# Patient Record
Sex: FEMALE | Race: WHITE | NOT HISPANIC OR LATINO | Employment: UNEMPLOYED | URBAN - METROPOLITAN AREA
[De-identification: names, ages, dates, MRNs, and addresses within clinical notes are randomized per-mention and may not be internally consistent; named-entity substitution may affect disease eponyms.]

---

## 2017-04-04 ENCOUNTER — GENERIC CONVERSION - ENCOUNTER (OUTPATIENT)
Dept: OTHER | Facility: OTHER | Age: 57
End: 2017-04-04

## 2017-05-22 ENCOUNTER — GENERIC CONVERSION - ENCOUNTER (OUTPATIENT)
Dept: OTHER | Facility: OTHER | Age: 57
End: 2017-05-22

## 2017-05-22 ENCOUNTER — ALLSCRIPTS OFFICE VISIT (OUTPATIENT)
Dept: OTHER | Facility: OTHER | Age: 57
End: 2017-05-22

## 2017-05-23 ENCOUNTER — GENERIC CONVERSION - ENCOUNTER (OUTPATIENT)
Dept: OTHER | Facility: OTHER | Age: 57
End: 2017-05-23

## 2017-06-13 RX ORDER — OXYCODONE HCL 10 MG/1
10 TABLET, FILM COATED, EXTENDED RELEASE ORAL EVERY 12 HOURS
Status: ON HOLD | COMMUNITY
End: 2017-06-28

## 2017-06-13 RX ORDER — MULTIVIT-MIN/IRON/FOLIC ACID/K 18-600-40
2000 CAPSULE ORAL DAILY
COMMUNITY

## 2017-06-13 RX ORDER — FLUTICASONE PROPIONATE 50 MCG
1 SPRAY, SUSPENSION (ML) NASAL DAILY
COMMUNITY

## 2017-06-13 RX ORDER — PRASTERONE (DHEA) 50 MG
50 CAPSULE ORAL DAILY
COMMUNITY

## 2017-06-13 RX ORDER — METHOCARBAMOL 750 MG/1
750 TABLET, FILM COATED ORAL AS NEEDED
COMMUNITY

## 2017-06-13 RX ORDER — PREGABALIN 200 MG/1
200 CAPSULE ORAL 2 TIMES DAILY
COMMUNITY

## 2017-06-13 RX ORDER — NORETHINDRONE ACETATE AND ETHINYL ESTRADIOL .5; 2.5 MG/1; UG/1
1 TABLET ORAL DAILY
COMMUNITY

## 2017-06-14 ENCOUNTER — ANESTHESIA EVENT (OUTPATIENT)
Dept: PERIOP | Facility: HOSPITAL | Age: 57
End: 2017-06-14
Payer: COMMERCIAL

## 2017-06-15 RX ORDER — OXYCODONE HYDROCHLORIDE AND ACETAMINOPHEN 5; 325 MG/1; MG/1
1 TABLET ORAL EVERY 4 HOURS PRN
COMMUNITY

## 2017-06-16 RX ORDER — BIOTIN 10000 MCG
1 CAPSULE ORAL DAILY
COMMUNITY

## 2017-06-19 RX ORDER — SODIUM CHLORIDE, SODIUM LACTATE, POTASSIUM CHLORIDE, CALCIUM CHLORIDE 600; 310; 30; 20 MG/100ML; MG/100ML; MG/100ML; MG/100ML
20 INJECTION, SOLUTION INTRAVENOUS CONTINUOUS
Status: DISCONTINUED | OUTPATIENT
Start: 2017-06-20 | End: 2017-06-20 | Stop reason: HOSPADM

## 2017-06-20 ENCOUNTER — ANESTHESIA (OUTPATIENT)
Dept: PERIOP | Facility: HOSPITAL | Age: 57
End: 2017-06-20
Payer: COMMERCIAL

## 2017-06-20 ENCOUNTER — APPOINTMENT (OUTPATIENT)
Dept: RADIOLOGY | Facility: HOSPITAL | Age: 57
End: 2017-06-20
Payer: COMMERCIAL

## 2017-06-20 ENCOUNTER — HOSPITAL ENCOUNTER (OUTPATIENT)
Facility: HOSPITAL | Age: 57
Setting detail: OUTPATIENT SURGERY
Discharge: HOME | End: 2017-06-20
Attending: NEUROLOGICAL SURGERY | Admitting: NEUROLOGICAL SURGERY
Payer: COMMERCIAL

## 2017-06-20 VITALS
OXYGEN SATURATION: 99 % | RESPIRATION RATE: 22 BRPM | HEART RATE: 58 BPM | WEIGHT: 130 LBS | DIASTOLIC BLOOD PRESSURE: 58 MMHG | SYSTOLIC BLOOD PRESSURE: 131 MMHG | BODY MASS INDEX: 23.92 KG/M2 | HEIGHT: 62 IN | TEMPERATURE: 97.8 F

## 2017-06-20 LAB
ABO GROUP BLD: NORMAL
BLD GP AB SCN SERPL QL: NEGATIVE
EXT PREGNANCY TEST URINE: NEGATIVE
RH BLD: POSITIVE
SPECIMEN EXPIRATION DATE: NORMAL

## 2017-06-20 PROCEDURE — 86901 BLOOD TYPING SEROLOGIC RH(D): CPT | Performed by: NEUROLOGICAL SURGERY

## 2017-06-20 PROCEDURE — 81025 URINE PREGNANCY TEST: CPT | Performed by: NEUROLOGICAL SURGERY

## 2017-06-20 PROCEDURE — 86900 BLOOD TYPING SEROLOGIC ABO: CPT | Performed by: NEUROLOGICAL SURGERY

## 2017-06-20 PROCEDURE — 72070 X-RAY EXAM THORAC SPINE 2VWS: CPT

## 2017-06-20 PROCEDURE — 86850 RBC ANTIBODY SCREEN: CPT | Performed by: NEUROLOGICAL SURGERY

## 2017-06-20 PROCEDURE — C1883 ADAPT/EXT, PACING/NEURO LEAD: HCPCS | Performed by: NEUROLOGICAL SURGERY

## 2017-06-20 PROCEDURE — C1767 GENERATOR, NEURO NON-RECHARG: HCPCS | Performed by: NEUROLOGICAL SURGERY

## 2017-06-20 DEVICE — IMPLANTABLE DEVICE
Type: IMPLANTABLE DEVICE | Site: BACK | Status: NON-FUNCTIONAL
Removed: 2017-06-28

## 2017-06-20 RX ORDER — FENTANYL CITRATE 50 UG/ML
INJECTION, SOLUTION INTRAMUSCULAR; INTRAVENOUS AS NEEDED
Status: DISCONTINUED | OUTPATIENT
Start: 2017-06-20 | End: 2017-06-20 | Stop reason: SURG

## 2017-06-20 RX ORDER — OXYCODONE HYDROCHLORIDE AND ACETAMINOPHEN 5; 325 MG/1; MG/1
2 TABLET ORAL EVERY 4 HOURS PRN
Status: DISCONTINUED | OUTPATIENT
Start: 2017-06-20 | End: 2017-06-20 | Stop reason: HOSPADM

## 2017-06-20 RX ORDER — ONDANSETRON 2 MG/ML
INJECTION INTRAMUSCULAR; INTRAVENOUS AS NEEDED
Status: DISCONTINUED | OUTPATIENT
Start: 2017-06-20 | End: 2017-06-20 | Stop reason: SURG

## 2017-06-20 RX ORDER — ONDANSETRON 2 MG/ML
4 INJECTION INTRAMUSCULAR; INTRAVENOUS EVERY 6 HOURS PRN
Status: DISCONTINUED | OUTPATIENT
Start: 2017-06-20 | End: 2017-06-20 | Stop reason: HOSPADM

## 2017-06-20 RX ORDER — SUCCINYLCHOLINE CHLORIDE 20 MG/ML
INJECTION INTRAMUSCULAR; INTRAVENOUS AS NEEDED
Status: DISCONTINUED | OUTPATIENT
Start: 2017-06-20 | End: 2017-06-20 | Stop reason: SURG

## 2017-06-20 RX ORDER — ROCURONIUM BROMIDE 10 MG/ML
INJECTION, SOLUTION INTRAVENOUS AS NEEDED
Status: DISCONTINUED | OUTPATIENT
Start: 2017-06-20 | End: 2017-06-20 | Stop reason: SURG

## 2017-06-20 RX ORDER — FENTANYL CITRATE/PF 50 MCG/ML
25 SYRINGE (ML) INJECTION
Status: DISCONTINUED | OUTPATIENT
Start: 2017-06-20 | End: 2017-06-20 | Stop reason: HOSPADM

## 2017-06-20 RX ORDER — PROPOFOL 10 MG/ML
INJECTION, EMULSION INTRAVENOUS AS NEEDED
Status: DISCONTINUED | OUTPATIENT
Start: 2017-06-20 | End: 2017-06-20 | Stop reason: SURG

## 2017-06-20 RX ORDER — METOCLOPRAMIDE HYDROCHLORIDE 5 MG/ML
10 INJECTION INTRAMUSCULAR; INTRAVENOUS ONCE AS NEEDED
Status: DISCONTINUED | OUTPATIENT
Start: 2017-06-20 | End: 2017-06-20 | Stop reason: HOSPADM

## 2017-06-20 RX ORDER — ONDANSETRON 2 MG/ML
4 INJECTION INTRAMUSCULAR; INTRAVENOUS ONCE AS NEEDED
Status: DISCONTINUED | OUTPATIENT
Start: 2017-06-20 | End: 2017-06-20 | Stop reason: HOSPADM

## 2017-06-20 RX ORDER — PROPOFOL 10 MG/ML
INJECTION, EMULSION INTRAVENOUS CONTINUOUS PRN
Status: DISCONTINUED | OUTPATIENT
Start: 2017-06-20 | End: 2017-06-20 | Stop reason: SURG

## 2017-06-20 RX ORDER — MIDAZOLAM HYDROCHLORIDE 1 MG/ML
INJECTION INTRAMUSCULAR; INTRAVENOUS AS NEEDED
Status: DISCONTINUED | OUTPATIENT
Start: 2017-06-20 | End: 2017-06-20 | Stop reason: SURG

## 2017-06-20 RX ADMIN — PROPOFOL 100 MCG/KG/MIN: 10 INJECTION, EMULSION INTRAVENOUS at 12:25

## 2017-06-20 RX ADMIN — CEFAZOLIN SODIUM 2000 MG: 2 SOLUTION INTRAVENOUS at 12:33

## 2017-06-20 RX ADMIN — SODIUM CHLORIDE, SODIUM LACTATE, POTASSIUM CHLORIDE, AND CALCIUM CHLORIDE 20 ML/HR: .6; .31; .03; .02 INJECTION, SOLUTION INTRAVENOUS at 11:40

## 2017-06-20 RX ADMIN — OXYCODONE HYDROCHLORIDE AND ACETAMINOPHEN 2 TABLET: 5; 325 TABLET ORAL at 16:42

## 2017-06-20 RX ADMIN — ROCURONIUM BROMIDE 5 MG: 10 INJECTION, SOLUTION INTRAVENOUS at 12:25

## 2017-06-20 RX ADMIN — ONDANSETRON 4 MG: 2 INJECTION INTRAMUSCULAR; INTRAVENOUS at 14:45

## 2017-06-20 RX ADMIN — FENTANYL CITRATE 25 MCG: 50 INJECTION, SOLUTION INTRAMUSCULAR; INTRAVENOUS at 15:33

## 2017-06-20 RX ADMIN — FENTANYL CITRATE 25 MCG: 50 INJECTION, SOLUTION INTRAMUSCULAR; INTRAVENOUS at 15:43

## 2017-06-20 RX ADMIN — FENTANYL CITRATE 100 MCG: 50 INJECTION, SOLUTION INTRAMUSCULAR; INTRAVENOUS at 12:25

## 2017-06-20 RX ADMIN — MIDAZOLAM HYDROCHLORIDE 2 MG: 1 INJECTION, SOLUTION INTRAMUSCULAR; INTRAVENOUS at 12:25

## 2017-06-20 RX ADMIN — SUCCINYLCHOLINE CHLORIDE 100 MG: 20 INJECTION, SOLUTION INTRAMUSCULAR; INTRAVENOUS at 12:25

## 2017-06-20 RX ADMIN — PROPOFOL 150 MG: 10 INJECTION, EMULSION INTRAVENOUS at 12:25

## 2017-06-27 ENCOUNTER — GENERIC CONVERSION - ENCOUNTER (OUTPATIENT)
Dept: OTHER | Facility: OTHER | Age: 57
End: 2017-06-27

## 2017-06-28 ENCOUNTER — ANESTHESIA (OUTPATIENT)
Dept: PERIOP | Facility: HOSPITAL | Age: 57
End: 2017-06-28
Payer: COMMERCIAL

## 2017-06-28 ENCOUNTER — ANESTHESIA EVENT (OUTPATIENT)
Dept: PERIOP | Facility: HOSPITAL | Age: 57
End: 2017-06-28
Payer: COMMERCIAL

## 2017-06-28 ENCOUNTER — HOSPITAL ENCOUNTER (OUTPATIENT)
Facility: HOSPITAL | Age: 57
Setting detail: OUTPATIENT SURGERY
Discharge: HOME | End: 2017-06-28
Attending: NEUROLOGICAL SURGERY | Admitting: NEUROLOGICAL SURGERY
Payer: COMMERCIAL

## 2017-06-28 ENCOUNTER — GENERIC CONVERSION - ENCOUNTER (OUTPATIENT)
Dept: PERIOP | Facility: HOSPITAL | Age: 57
End: 2017-06-28

## 2017-06-28 ENCOUNTER — APPOINTMENT (OUTPATIENT)
Dept: RADIOLOGY | Facility: HOSPITAL | Age: 57
End: 2017-06-28
Payer: COMMERCIAL

## 2017-06-28 VITALS
HEART RATE: 64 BPM | SYSTOLIC BLOOD PRESSURE: 114 MMHG | TEMPERATURE: 99 F | WEIGHT: 130 LBS | HEIGHT: 62 IN | OXYGEN SATURATION: 99 % | DIASTOLIC BLOOD PRESSURE: 71 MMHG | BODY MASS INDEX: 23.92 KG/M2 | RESPIRATION RATE: 16 BRPM

## 2017-06-28 LAB
ABO GROUP BLD: NORMAL
BLD GP AB SCN SERPL QL: NEGATIVE
GLUCOSE SERPL-MCNC: 96 MG/DL (ref 65–140)
RH BLD: POSITIVE
SPECIMEN EXPIRATION DATE: NORMAL

## 2017-06-28 PROCEDURE — 82948 REAGENT STRIP/BLOOD GLUCOSE: CPT

## 2017-06-28 PROCEDURE — 72100 X-RAY EXAM L-S SPINE 2/3 VWS: CPT

## 2017-06-28 PROCEDURE — 86901 BLOOD TYPING SEROLOGIC RH(D): CPT | Performed by: NEUROLOGICAL SURGERY

## 2017-06-28 PROCEDURE — 86900 BLOOD TYPING SEROLOGIC ABO: CPT | Performed by: NEUROLOGICAL SURGERY

## 2017-06-28 PROCEDURE — 86850 RBC ANTIBODY SCREEN: CPT | Performed by: NEUROLOGICAL SURGERY

## 2017-06-28 RX ORDER — SUCCINYLCHOLINE CHLORIDE 20 MG/ML
INJECTION INTRAMUSCULAR; INTRAVENOUS AS NEEDED
Status: DISCONTINUED | OUTPATIENT
Start: 2017-06-28 | End: 2017-06-28 | Stop reason: SURG

## 2017-06-28 RX ORDER — KETAMINE HYDROCHLORIDE 50 MG/ML
INJECTION, SOLUTION, CONCENTRATE INTRAMUSCULAR; INTRAVENOUS AS NEEDED
Status: DISCONTINUED | OUTPATIENT
Start: 2017-06-28 | End: 2017-06-28 | Stop reason: SURG

## 2017-06-28 RX ORDER — SODIUM CHLORIDE, SODIUM LACTATE, POTASSIUM CHLORIDE, CALCIUM CHLORIDE 600; 310; 30; 20 MG/100ML; MG/100ML; MG/100ML; MG/100ML
75 INJECTION, SOLUTION INTRAVENOUS CONTINUOUS
Status: DISCONTINUED | OUTPATIENT
Start: 2017-06-28 | End: 2017-06-28 | Stop reason: HOSPADM

## 2017-06-28 RX ORDER — PROPOFOL 10 MG/ML
INJECTION, EMULSION INTRAVENOUS AS NEEDED
Status: DISCONTINUED | OUTPATIENT
Start: 2017-06-28 | End: 2017-06-28 | Stop reason: SURG

## 2017-06-28 RX ORDER — PROPOFOL 10 MG/ML
INJECTION, EMULSION INTRAVENOUS CONTINUOUS PRN
Status: DISCONTINUED | OUTPATIENT
Start: 2017-06-28 | End: 2017-06-28 | Stop reason: SURG

## 2017-06-28 RX ORDER — LIDOCAINE HYDROCHLORIDE 10 MG/ML
INJECTION, SOLUTION INFILTRATION; PERINEURAL AS NEEDED
Status: DISCONTINUED | OUTPATIENT
Start: 2017-06-28 | End: 2017-06-28 | Stop reason: SURG

## 2017-06-28 RX ORDER — FENTANYL CITRATE/PF 50 MCG/ML
50 SYRINGE (ML) INJECTION
Status: DISCONTINUED | OUTPATIENT
Start: 2017-06-28 | End: 2017-06-28 | Stop reason: HOSPADM

## 2017-06-28 RX ORDER — TRAMADOL HYDROCHLORIDE 50 MG/1
50 TABLET ORAL EVERY 4 HOURS PRN
Status: DISCONTINUED | OUTPATIENT
Start: 2017-06-28 | End: 2017-06-28 | Stop reason: HOSPADM

## 2017-06-28 RX ORDER — ONDANSETRON 2 MG/ML
INJECTION INTRAMUSCULAR; INTRAVENOUS AS NEEDED
Status: DISCONTINUED | OUTPATIENT
Start: 2017-06-28 | End: 2017-06-28 | Stop reason: SURG

## 2017-06-28 RX ORDER — SODIUM CHLORIDE, SODIUM LACTATE, POTASSIUM CHLORIDE, CALCIUM CHLORIDE 600; 310; 30; 20 MG/100ML; MG/100ML; MG/100ML; MG/100ML
INJECTION, SOLUTION INTRAVENOUS CONTINUOUS PRN
Status: DISCONTINUED | OUTPATIENT
Start: 2017-06-28 | End: 2017-06-28 | Stop reason: SURG

## 2017-06-28 RX ORDER — ONDANSETRON 2 MG/ML
4 INJECTION INTRAMUSCULAR; INTRAVENOUS EVERY 4 HOURS PRN
Status: DISCONTINUED | OUTPATIENT
Start: 2017-06-28 | End: 2017-06-28 | Stop reason: HOSPADM

## 2017-06-28 RX ORDER — CHLORHEXIDINE GLUCONATE 0.12 MG/ML
15 RINSE ORAL ONCE
Status: COMPLETED | OUTPATIENT
Start: 2017-06-28 | End: 2017-06-28

## 2017-06-28 RX ORDER — CHLORHEXIDINE GLUCONATE 0.12 MG/ML
15 RINSE ORAL ONCE
Status: DISCONTINUED | OUTPATIENT
Start: 2017-06-28 | End: 2017-06-28

## 2017-06-28 RX ORDER — MIDAZOLAM HYDROCHLORIDE 1 MG/ML
INJECTION INTRAMUSCULAR; INTRAVENOUS AS NEEDED
Status: DISCONTINUED | OUTPATIENT
Start: 2017-06-28 | End: 2017-06-28 | Stop reason: SURG

## 2017-06-28 RX ORDER — ACETAMINOPHEN 325 MG/1
650 TABLET ORAL EVERY 6 HOURS PRN
Status: DISCONTINUED | OUTPATIENT
Start: 2017-06-28 | End: 2017-06-28 | Stop reason: HOSPADM

## 2017-06-28 RX ORDER — OXYCODONE HYDROCHLORIDE AND ACETAMINOPHEN 5; 325 MG/1; MG/1
2 TABLET ORAL ONCE
Status: COMPLETED | OUTPATIENT
Start: 2017-06-28 | End: 2017-06-28

## 2017-06-28 RX ORDER — FENTANYL CITRATE 50 UG/ML
INJECTION, SOLUTION INTRAMUSCULAR; INTRAVENOUS AS NEEDED
Status: DISCONTINUED | OUTPATIENT
Start: 2017-06-28 | End: 2017-06-28 | Stop reason: SURG

## 2017-06-28 RX ORDER — MULTIVIT-MIN/IRON/FOLIC ACID/K 18-600-40
1 CAPSULE ORAL DAILY
COMMUNITY

## 2017-06-28 RX ORDER — LIDOCAINE HYDROCHLORIDE AND EPINEPHRINE 10; 10 MG/ML; UG/ML
INJECTION, SOLUTION INFILTRATION; PERINEURAL AS NEEDED
Status: DISCONTINUED | OUTPATIENT
Start: 2017-06-28 | End: 2017-06-28 | Stop reason: HOSPADM

## 2017-06-28 RX ADMIN — CEFAZOLIN SODIUM 2000 MG: 2 SOLUTION INTRAVENOUS at 08:31

## 2017-06-28 RX ADMIN — KETAMINE HYDROCHLORIDE 20 MG: 50 INJECTION, SOLUTION INTRAMUSCULAR; INTRAVENOUS at 08:53

## 2017-06-28 RX ADMIN — FENTANYL CITRATE 50 MCG: 50 INJECTION INTRAMUSCULAR; INTRAVENOUS at 10:06

## 2017-06-28 RX ADMIN — HYDROMORPHONE HYDROCHLORIDE 1 MG: 1 INJECTION, SOLUTION INTRAMUSCULAR; INTRAVENOUS; SUBCUTANEOUS at 11:03

## 2017-06-28 RX ADMIN — FENTANYL CITRATE 50 MCG: 50 INJECTION, SOLUTION INTRAMUSCULAR; INTRAVENOUS at 08:55

## 2017-06-28 RX ADMIN — FENTANYL CITRATE 50 MCG: 50 INJECTION, SOLUTION INTRAMUSCULAR; INTRAVENOUS at 09:06

## 2017-06-28 RX ADMIN — ONDANSETRON 4 MG: 2 INJECTION INTRAMUSCULAR; INTRAVENOUS at 09:13

## 2017-06-28 RX ADMIN — OXYCODONE HYDROCHLORIDE AND ACETAMINOPHEN 2 TABLET: 5; 325 TABLET ORAL at 10:39

## 2017-06-28 RX ADMIN — HYDROMORPHONE HYDROCHLORIDE 1 MG: 1 INJECTION, SOLUTION INTRAMUSCULAR; INTRAVENOUS; SUBCUTANEOUS at 10:42

## 2017-06-28 RX ADMIN — PROPOFOL 180 MG: 10 INJECTION, EMULSION INTRAVENOUS at 08:29

## 2017-06-28 RX ADMIN — HYDROMORPHONE HYDROCHLORIDE 0.2 MG: 1 INJECTION, SOLUTION INTRAMUSCULAR; INTRAVENOUS; SUBCUTANEOUS at 10:14

## 2017-06-28 RX ADMIN — SUCCINYLCHOLINE CHLORIDE 100 MG: 20 INJECTION, SOLUTION INTRAMUSCULAR; INTRAVENOUS at 08:29

## 2017-06-28 RX ADMIN — CHLORHEXIDINE GLUCONATE 15 ML: 1.2 RINSE ORAL at 07:01

## 2017-06-28 RX ADMIN — HYDROMORPHONE HYDROCHLORIDE 0.2 MG: 1 INJECTION, SOLUTION INTRAMUSCULAR; INTRAVENOUS; SUBCUTANEOUS at 10:24

## 2017-06-28 RX ADMIN — PROPOFOL 100 MCG/KG/MIN: 10 INJECTION, EMULSION INTRAVENOUS at 08:30

## 2017-06-28 RX ADMIN — SODIUM CHLORIDE, SODIUM LACTATE, POTASSIUM CHLORIDE, AND CALCIUM CHLORIDE: .6; .31; .03; .02 INJECTION, SOLUTION INTRAVENOUS at 08:10

## 2017-06-28 RX ADMIN — KETAMINE HYDROCHLORIDE 30 MG: 50 INJECTION, SOLUTION INTRAMUSCULAR; INTRAVENOUS at 08:29

## 2017-06-28 RX ADMIN — LIDOCAINE HYDROCHLORIDE 50 MG: 10 INJECTION, SOLUTION INFILTRATION; PERINEURAL at 08:29

## 2017-06-28 RX ADMIN — HYDROMORPHONE HYDROCHLORIDE 0.2 MG: 1 INJECTION, SOLUTION INTRAMUSCULAR; INTRAVENOUS; SUBCUTANEOUS at 10:34

## 2017-06-28 RX ADMIN — FENTANYL CITRATE 100 MCG: 50 INJECTION, SOLUTION INTRAMUSCULAR; INTRAVENOUS at 08:29

## 2017-06-28 RX ADMIN — DEXAMETHASONE SODIUM PHOSPHATE 10 MG: 10 INJECTION INTRAMUSCULAR; INTRAVENOUS at 08:35

## 2017-06-28 RX ADMIN — HYDROMORPHONE HYDROCHLORIDE 0.2 MG: 1 INJECTION, SOLUTION INTRAMUSCULAR; INTRAVENOUS; SUBCUTANEOUS at 10:19

## 2017-06-28 RX ADMIN — SODIUM CHLORIDE, SODIUM LACTATE, POTASSIUM CHLORIDE, AND CALCIUM CHLORIDE 75 ML/HR: .6; .31; .03; .02 INJECTION, SOLUTION INTRAVENOUS at 10:45

## 2017-06-28 RX ADMIN — MIDAZOLAM HYDROCHLORIDE 2 MG: 1 INJECTION, SOLUTION INTRAMUSCULAR; INTRAVENOUS at 08:21

## 2017-06-28 RX ADMIN — HYDROMORPHONE HYDROCHLORIDE 0.2 MG: 1 INJECTION, SOLUTION INTRAMUSCULAR; INTRAVENOUS; SUBCUTANEOUS at 10:29

## 2017-06-28 RX ADMIN — FENTANYL CITRATE 50 MCG: 50 INJECTION INTRAMUSCULAR; INTRAVENOUS at 10:00

## 2017-06-30 ENCOUNTER — GENERIC CONVERSION - ENCOUNTER (OUTPATIENT)
Dept: OTHER | Facility: OTHER | Age: 57
End: 2017-06-30

## 2017-07-12 ENCOUNTER — ALLSCRIPTS OFFICE VISIT (OUTPATIENT)
Dept: OTHER | Facility: OTHER | Age: 57
End: 2017-07-12

## 2017-11-09 ENCOUNTER — GENERIC CONVERSION - ENCOUNTER (OUTPATIENT)
Dept: OTHER | Facility: OTHER | Age: 57
End: 2017-11-09

## 2018-01-09 NOTE — PROGRESS NOTES
Assessment    1  Encounter for postoperative care (V59 22) (Z73 03)    Discussion/Summary    Very pleasant 72-year-old female returns for two-week postoperative follow-up, status post "Removal of Two Spinal Cord Stimulators along with Dorsal Root Ganglion Electrodes- L5 and S1," she reports overall she is pleased with surgical outcome, although she is disappointed that the DRG was ineffective in improving/controlling her chronic foot pain  Wound care was reviewed with the patient, specifically she is to observe for redness, swelling, increased pain, drainage or fever, should these be observed he understands she is too call and/or return immediately for reassessment  Additionally the patient understands he may shower, wash with soap and water, pat wound dry, she also understands he is to avoid immersion in water such as swimming, hot tub use or tub bath, and may resume immersion in water in approximately 2 weeks  Activity levels were also reviewed with the patient in detail, she is to lift no greater than 10 pounds, she is advised she may occasionally bend, ambulation is encouraged as tolerated  Further follow with neurosurgery will be on an as needed basis  These findings, impressions and recommendations are reviewed in great detail with the patient, she expressed understanding and agreement, her questions were answered completely and to her satisfaction  Follow up has been scheduled  The patient was counseled regarding instructions for management, patient and family education, importance of compliance with treatment  The patient has the current Goals: Return to all usual activities  Patient is able to Self-Care  Chief Complaint  Postoperative follow-up, 2 weeks, status post removal DRG and generator  Post-Op  Post-Op Lumbar/Sacral Spine:   Thuy Saldivar is status post on 06/28/2017   1   Removal of Two Spinal Cord Stimulators along Dorsal Root Ganglion Electrodes- L5 and S1 2 Removal of extensions      History of Present Illness: The patient reports lower extremity numbness, lower extremity pain and walking/standing/sitting intolerance, but no nausea, no fever, no back pain, no lower extremity weakness and normal bowel and bladder function  Physical Examination:   Surgical incision site is clean, dry and intact (the incision site had intact staples and had no drainage )  The surrounding skin findings included normal appearance  Evaluation of the back demonstrates no warmth, no erythema, no swelling, no induration, no ecchymosis and no tenderness  The gait was normal and the station was normal    Lower Extremity DVT Assessment: no signs or symptoms suggestive of deep vein thrombosis, no calf swelling and no palpable cord in calf  Motor Exam: motor groups within normal limits of strength & tone bilaterally  Sensory Exam: sensory functions within normal limits bilaterally  Assessment:   Post-op, the patient is doing well, with good pain control and without signs of an infection  Plan: To Do For Next Visit:  Further follow-up as needed  Review of Systems    Constitutional: No fever, no chills, feels well, no tiredness, no recent weight gain or weight loss  Eyes: No complaints of eye pain, no red eyes, no eyesight problems, no discharge, no dry eyes, no itching of eyes  ENT: no complaints of earache, no loss of hearing, no nose bleeds, no nasal discharge, no sore throat, no hoarseness  Cardiovascular: No complaints of slow heart rate, no fast heart rate, no chest pain, no palpitations, no leg claudication, no lower extremity edema  Respiratory: No complaints of shortness of breath, no wheezing, no cough, no SOB on exertion, no orthopnea, no PND  Gastrointestinal: No complaints of abdominal pain, no constipation, no nausea or vomiting, no diarrhea, no bloody stools     Genitourinary: No complaints of dysuria, no incontinence, no pelvic pain, no dysmenorrhea, no vaginal discharge or bleeding  Musculoskeletal: No complaints of arthralgias, no myalgias, no joint swelling or stiffness, no limb pain or swelling  Integumentary: skin wound and incisions  Neurological: numbness (left thigh) and difficulty walking  Psychiatric: Not suicidal, no sleep disturbance, no anxiety or depression, no change in personality, no emotional problems  Endocrine: No complaints of proptosis, no hot flashes, no muscle weakness, no deepening of the voice, no feelings of weakness  Hematologic/Lymphatic: No complaints of swollen glands, no swollen glands in the neck, does not bleed easily, does not bruise easily  ROS reviewed  Active Problems    1  Allergic rhinitis (477 9) (J30 9)   2  Arthritis of right shoulder region (716 91) (M19 011)   3  Chronic diarrhea (787 91) (K52 9)   4  Chronic pain in right foot (729 5,338 29) (M79 671,G89 29)   5  Chronic pain syndrome (338 4) (G89 4)   6  Complex regional pain syndrome type 1 of right lower extremity (337 22) (G90 521)   7  Osteopenia (733 90) (M85 80)   8  Post-operative state (V45 89) (Z98 890)   9  Pre-procedural laboratory examinations (V72 63) (Z01 812)   10  Raynaud disease (443 0) (I73 00)   11  Sensory peripheral neuropathy (356 9) (G62 9)    Social History    · Alcohol use (V49 89) (Z78 9)   · Exercise frequency (times/week)   ·    · Never a smoker   · Sedentary lifestyle (V15 89) (Z91 89)    Current Meds   1  Biotin 1000 MCG Oral Tablet; Therapy: (Recorded:34Hcm1495) to Recorded   2  BL Vitamin C 500 MG TABS; Therapy: (Recorded:89Aqh2937) to Recorded   3  Caltrate 600+D 600-400 MG-UNIT TABS; Therapy: (Recorded:17Tgr9207) to Recorded   4  Centrum Silver TABS; Therapy: (Recorded:66Vou4854) to Recorded   5  DHEA 50 MG Oral Capsule; TAKE AS DIRECTED; Therapy: 54PPO2791 to (Last Rx:22Jun2016) Ordered   6   Fluticasone Propionate 50 MCG/ACT Nasal Suspension; USE 2 SPRAYS IN EACH   NOSTRIL ONCE DAILY; Therapy: 47WCF3413 to (Last Rx:22Jun2016) Ordered   7  [facility]; Therapy: (Recorded:15Nlk4490) to Recorded   8  Lyrica 200 MG Oral Capsule; Take 1 capsule twice daily; Therapy: (Laxmi Cruz) to Recorded   9  Methocarbamol 750 MG Oral Tablet; TAKE 1 TABLET 3 times daily PRN; Therapy: 85HDM4478 to (Last Rx:22Jun2016) Ordered   10  Norethindrone-Eth Estradiol 0 5-2 5 MG-MCG Oral Tablet; TAKE 1 TABLET DAILY AS    DIRECTED; Therapy: 78TEH1652 to (Evaluate:17Jun2017); Last Rx:22Jun2016 Ordered   11  Oxycodone-Acetaminophen 5-325 MG Oral Tablet; take 1 to 2 tablets every 4 hours as    needed for pain; Therapy: 44LBJ6472 to (Last Rx:29Jun2017) Ordered   12  Vitamin D3 2000 UNIT Oral Capsule; TAKE 2 CAPSULE Daily; Therapy: 20LSZ5127 to (Last Rx:22Jun2016) Ordered    Allergies    1  No Known Drug Allergies    Vitals   Recorded: 93Vge9551 10:42AM   Temperature 98 2 F   Heart Rate 73   Respiration 14   Systolic 612   Diastolic 75   Height 5 ft 2 in   Weight 130 lb    BMI Calculated 23 78   BSA Calculated 1 59     Physical Exam     Respiratory Respiratory effort: Normal   Auscultation of lungs: Clear to auscultation bilaterally  Cardiovascular Auscultation of heart: Rate is regular  Rhythm is regular  No heart murmur appreciated  Procedure  Procedure: suture removal  The wound was located on the right buttock and left buttock  The wound was Combined approximately 12 cm in length  Wound Exam: well healed with no sign of infection  Procedure Note: Approximately 20 staples were removed  Patient Status:  the patient tolerated the procedure well  Complications:  there were no complications  Future Appointments    Date/Time Provider Specialty Site   08/10/2017 11:00 AM Reed Primrose, M D   Neurosurgery [facility]     Signatures   Electronically signed by : Vinod Goss, [facility]; Jul 12 2017 11:15AM EST                       (Author)    Electronically signed by : Jolie Gottlieb, [facility]; Jul 12 2017 11:16AM EST                       (Author)    Electronically signed by : DOTTY Justin ; Jul 17 2017  9:21AM EST                       (Author)

## 2018-01-10 NOTE — RESULT NOTES
Message  The patient required returning to the operating room after the week-long trial for removal of her trial electrodes secondary to the use of extensions for a tunneled trial  These could only be removed via an open approach        Signatures   Electronically signed by : DOTTY Wilson ; Nov 9 2017  4:54PM EST                       (Author)

## 2018-01-13 NOTE — MISCELLANEOUS
Message  S/w pt on telephone that is scheduled for surgery tomorrow, 6/28/17  She is having removal of trial electrode  Verified allergies and went over pre-op instructions, including bathing and NPO status  Patient currently denies taking any blood thinning medications  Answered any questions she may have had at this time  Confirmed pharmacy on file and sent over post-op antibiotic and explained to patient when to start taking it  Active Problems    1  Allergic rhinitis (477 9) (J30 9)   2  Arthritis of right shoulder region (716 91) (M19 011)   3  Chronic diarrhea (787 91) (K52 9)   4  Chronic pain in right foot (729 5,338 29) (M79 671,G89 29)   5  Chronic pain syndrome (338 4) (G89 4)   6  Complex regional pain syndrome type 1 of right lower extremity (337 22) (G90 521)   7  Osteopenia (733 90) (M85 80)   8  Post-operative state (V45 89) (Z98 890)   9  Pre-procedural laboratory examinations (V72 63) (Z01 812)   10  Raynaud disease (443 0) (I73 00)   11  Sensory peripheral neuropathy (356 9) (G62 9)    Current Meds   1  Cephalexin 500 MG Oral Capsule (Keflex); TAKE 1 CAPSULE 4 TIMES DAILY; Therapy: 13EGW4229 to (Evaluate:11Uak5473)  Requested for: 27Jun2017; Last   Rx:27Jun2017; Status: ACTIVE - Retrospective By Protocol Authorization Ordered   2  DHEA 50 MG Oral Capsule; TAKE AS DIRECTED; Therapy: 52SNH2682 to (Last Rx:22Jun2016) Ordered   3  Fluticasone Propionate 50 MCG/ACT Nasal Suspension; USE 2 SPRAYS IN EACH   NOSTRIL ONCE DAILY; Therapy: 19XKQ5167 to (Last Rx:22Jun2016) Ordered   4  Lyrica 200 MG Oral Capsule; Take 1 capsule twice daily; Therapy: (Juan Barnett) to Recorded   5  Methocarbamol 750 MG Oral Tablet; TAKE 1 TABLET 3 times daily PRN; Therapy: 79CVP9021 to (Last Rx:22Jun2016) Ordered   6  Norethindrone-Eth Estradiol 0 5-2 5 MG-MCG Oral Tablet; TAKE 1 TABLET DAILY AS   DIRECTED; Therapy: 87FWC4316 to (Evaluate:17Jun2017); Last Rx:22Jun2016 Ordered   7   OxyCONTIN 10 MG Oral Tablet ER 12 Hour Abuse-Deterrent; TAKE 1 TABLET EVERY   12 HOURS DAILY; Therapy: 10VAT2657 to (Evaluate:77Rzl8101); Last Rx:22Jun2016 Ordered   8  Vitamin D3 2000 UNIT Oral Capsule; TAKE 2 CAPSULE Daily; Therapy: 12INV3870 to (Last Rx:22Jun2016) Ordered    Allergies    1   No Known Drug Allergies    Signatures   Electronically signed by : Edith Puente RN; Jun 27 2017 11:06AM EST                       (Author)

## 2018-01-14 VITALS
DIASTOLIC BLOOD PRESSURE: 75 MMHG | BODY MASS INDEX: 23.92 KG/M2 | WEIGHT: 130 LBS | HEIGHT: 62 IN | HEART RATE: 73 BPM | RESPIRATION RATE: 14 BRPM | TEMPERATURE: 98.2 F | SYSTOLIC BLOOD PRESSURE: 123 MMHG

## 2018-01-16 NOTE — MISCELLANEOUS
Message   Recorded as Task   Date: 06/29/2017 09:15 AM, Created By: Mirna Donato   Task Name: Medical Complaint Callback   Assigned To: Mirna Donato   Regarding Patient: Quinten Quiñonez, Status: Active   [facility] - 29 Jun 2017 9:15 AM     TASK CREATED  Caller: Self; Medical Complaint; [phone] (Mobile Phone)  Spoke with patient on telephone, who is POD1  She reports that she is having alot of pain "from the area of the surgery down into her hips and buttocks"  She was not given a script for Percocet at the time of d/c because [facility] on 6/9 for 30 days worth of pills  Patient has been taking 2 tabs Q4 since her inititial surgery (trial) and now, so she is running out of pills in another day or 2  Advised her, will s/w Dr Yuli Nick and see what we can do for her  She was appreciative  Mirna Donato - 29 Jun 2017 9:20 AM     TASK EDITED  S/w Dr Yuli Nick, who was willing to provide patient with a script for Percocet 5/325 1-2 tabs Q4 prn #30  Printed script and have it ready for patient to p/u  Called patient and explained this to her and she was appreciative  She will come by to p/u script today  She was told that it is at the [facility] office in the [facility] and she was familiar with this location  Active Problems    1  Allergic rhinitis (477 9) (J30 9)   2  Arthritis of right shoulder region (716 91) (M19 011)   3  Chronic diarrhea (787 91) (K52 9)   4  Chronic pain in right foot (729 5,338 29) (M79 671,G89 29)   5  Chronic pain syndrome (338 4) (G89 4)   6  Complex regional pain syndrome type 1 of right lower extremity (337 22) (G90 521)   7  Osteopenia (733 90) (M85 80)   8  Post-operative state (V45 89) (Z98 890)   9  Pre-procedural laboratory examinations (V72 63) (Z01 812)   10  Raynaud disease (443 0) (I73 00)   11  Sensory peripheral neuropathy (356 9) (G62 9)    Current Meds   1   Cephalexin 500 MG Oral Capsule (Keflex); TAKE 1 CAPSULE 4 TIMES DAILY; Therapy: 04HYY2217 to (Evaluate:84Qis8511)  Requested for: 28Jun2017; Last   Rx:27Jun2017 Ordered   2  DHEA 50 MG Oral Capsule; TAKE AS DIRECTED; Therapy: 25ADS8396 to (Last Rx:22Jun2016) Ordered   3  Fluticasone Propionate 50 MCG/ACT Nasal Suspension; USE 2 SPRAYS IN EACH   NOSTRIL ONCE DAILY; Therapy: 46ZKE3472 to (Last Rx:22Jun2016) Ordered   4  Lyrica 200 MG Oral Capsule; Take 1 capsule twice daily; Therapy: (Katelyn Reyes to Recorded   5  Methocarbamol 750 MG Oral Tablet; TAKE 1 TABLET 3 times daily PRN; Therapy: 91WGL7504 to (Last Rx:22Jun2016) Ordered   6  Norethindrone-Eth Estradiol 0 5-2 5 MG-MCG Oral Tablet; TAKE 1 TABLET DAILY AS   DIRECTED; Therapy: 78HRF8804 to (Evaluate:17Jun2017); Last Rx:22Jun2016 Ordered   7  OxyCONTIN 10 MG Oral Tablet ER 12 Hour Abuse-Deterrent; TAKE 1 TABLET EVERY   12 HOURS DAILY; Therapy: 40LJU3116 to (Evaluate:91Mvw0249); Last Rx:22Jun2016 Ordered   8  Vitamin D3 2000 UNIT Oral Capsule; TAKE 2 CAPSULE Daily; Therapy: 97RCC8198 to (Last Rx:22Jun2016) Ordered    Allergies    1   No Known Drug Allergies    Plan  Post-operative state    · Oxycodone-Acetaminophen 5-325 MG Oral Tablet (Percocet); take 1 to 2 tablets  every 4 hours as needed for pain    Signatures   Electronically signed by : Edith Puente RN; Jun 29 2017  9:24AM EST                       (Author)

## 2018-01-18 NOTE — MISCELLANEOUS
Message  Patient called at 9:00am stating that she has been taking percocet without any relief-even when she takes two  Patient was provided script for percocet 5/325 from Dr Devin Gamez in the office on 6/28/17 for 30 tabs  She states that the pain is in the area were the electrodes were  Spoke with Marifer Taylor, who states that unfortunately we are not going to prescribe anything stronger and that she would need to schedule an appt with her pain mgmt dr, or if it is that unmanageable that she will need to report to the ER  Advised patient, maybe the stimulator was actually helping more than what she had thought  Ariel Aguirre and she responded with, "No, the stimulator was for my foot pain and it did nothing for that"        Signatures   Electronically signed by : Marcella Madera RN; Jun 30 2017  9:40AM EST                       (Author)

## 2018-01-18 NOTE — MISCELLANEOUS
Message   Recorded as Task   Date: 01/20/2017 09:47 AM, Created By: Gokul Pizarro   Task Name: Care Coordination   Assigned To: Gokul Pizarro   Regarding Patient: Francisco Vallejo, Status: Active   Comment:    Ingrid Tirado - 20 Jan 2017 9:47 AM     TASK CREATED  Pt to be a [facility] DRG trial with Dr Sis Nick psych eval has been faxed to Will Corcoran at [address] signed release of [facility] - 20 Jan 2017 9:47 AM     TASK IN PROGRESS   Ingrid Tirado - 20 Jan 2017 9:47 AM     TASK EDITED  Received psych eval, forwarded to Dr Sherryle Copes - 23 Jan 2017 11:08 AM     TASK EDITED  Signed off psych eval faxed to Will Corcoran at [facility]  Mariana Virkline - 25 Jan 2017 11:24 AM     TASK EDITED  spoke to pt she wanted to know status of her auth for the DRG I did explain to pt that her psych eval was faxed to [facility]   She would like a update she can be reached at [address] [place] [address] - 25 Jan 2017 12:52 PM     TASK EDITED  Spoke to pt and made her aware that we are awaiting insurance auth  Gokul Pizarro - 01 Feb 2017 9:23 AM     TASK EDITED  Received a denial from pt's insurance stating this procedure is considered investigational, insufficient peer review medical literature to show beneficial effect on health outcomes compared to established alternatives  The member's policy does not cover investigational services  A peer to peer appeal can be done, must be in writing and be faxed to [phone]  Please advise  Ingrid Tirado - 01 Feb 2017 9:26 AM     TASK EDITED  Attempt to reach pt to inform her  LM for return call  Ingrid Tirado - 14 Feb 2017 3:26 PM     TASK EDITED  Attempt to reach pt to inform her that she needs to call to initiate an appeal on her behalf  LM asking pt to return my call     Antonietta Dominguez - 14 Feb 2017 4:44 PM     TASK REPLIED TO: Previously Assigned To Antonietta Dominguez md aware   Ingrid Tirado - 15 Feb 2017 7:40 AM     TASK IN PROGRESS   Ingrid Tirado - 20 Feb 2017 9:36 AM     TASK EDITED  Spoke with pt and made her aware that she needs to call her insurance to initiate an appeal  Pt verbalized understating and states she will call the insurance  Ingrid Tirado - 28 Mar 2017 11:01 AM     TASK EDITED  Received a vm from pt stating she is trying to appeal the DRG trial with her insurance but is being told this is "experimental" and her insurance does not cover experimental  Pt asking if we have written proof that this is not experimental    Debbie Willoughby - 28 Mar 2017 12:03 PM     TASK REPLIED TO: Previously Assigned To Debbie Willoughby  yes will forward to you   Ingrid Tirado - 28 Mar 2017 12:58 PM     TASK REASSIGNED: Previously Assigned To SPA Tina mosquera Kelly - 29 Mar 2017 4:30 PM     TASK EDITED  Did you send me the info? Debbie Willoughby - 04 Apr 2017 9:28 AM     TASK REPLIED TO: Previously Assigned To [id number] was emailed to her   [facility] - 04 Apr 2017 10:20 AM     TASK EDITED  Thank you  Active Problems    1  Allergic rhinitis (477 9) (J30 9)   2  Arthritis of right shoulder region (716 91) (M19 011)   3  Chronic diarrhea (787 91) (K52 9)   4  Chronic pain in right foot (729 5,338 29) (M79 671,G89 29)   5  Chronic pain syndrome (338 4) (G89 4)   6  Complex regional pain syndrome type 1 of right lower extremity (337 22) (G90 521)   7  Osteopenia (733 90) (M85 80)   8  Pre-procedural laboratory examinations (V72 63) (Z01 812)   9  Raynaud disease (443 0) (I73 00)   10  Sensory peripheral neuropathy (356 9) (G62 9)    Current Meds   1  DHEA 50 MG Oral Capsule; TAKE AS DIRECTED; Therapy: 06ZDG6109 to (Last Rx:22Jun2016) Ordered   2  Fluticasone Propionate 50 MCG/ACT Nasal Suspension; USE 2 SPRAYS IN EACH   NOSTRIL ONCE DAILY; Therapy: 85ESM7515 to (Last Rx:22Jun2016) Ordered   3  Methocarbamol 750 MG Oral Tablet; TAKE 1 TABLET 3 times daily PRN;    Therapy: 48JIN7646 to (Last Rx:22Jun2016) Ordered   4  Norethindrone-Eth Estradiol 0 5-2 5 MG-MCG Oral Tablet; TAKE 1 TABLET DAILY AS   DIRECTED; Therapy: 40YQN2176 to (Evaluate:17Jun2017); Last Rx:22Jun2016 Ordered   5  OxyCONTIN 10 MG Oral Tablet ER 12 Hour Abuse-Deterrent; TAKE 1 TABLET EVERY   12 HOURS DAILY; Therapy: 69BAP8712 to (Evaluate:83Ktt6582); Last Rx:22Jun2016 Ordered   6  Vitamin D3 2000 UNIT Oral Capsule; TAKE 2 CAPSULE Daily; Therapy: 67LDF0499 to (Last Rx:22Jun2016) Ordered    Allergies    1   No Known Drug Allergies    Signatures   Electronically signed by : Carlito Zepeda, ; Apr 4 2017 10:21AM EST                       (Author)

## 2018-01-22 VITALS
RESPIRATION RATE: 16 BRPM | HEART RATE: 72 BPM | TEMPERATURE: 97.8 F | WEIGHT: 133 LBS | DIASTOLIC BLOOD PRESSURE: 101 MMHG | SYSTOLIC BLOOD PRESSURE: 161 MMHG | HEIGHT: 62 IN | BODY MASS INDEX: 24.48 KG/M2

## 2018-01-23 NOTE — PROGRESS NOTES
Assessment   1  Chronic pain syndrome (338 4) (G89 4)  2  Complex regional pain syndrome type 1 of right lower extremity (337 22) (G90 521)    Plan    · Schedule Surgery Treatment  Procedure  Status: Hold For - Scheduling  Requested for:  [id number]  Ordered; For: Complex regional pain syndrome type 1 of right lower extremity;  Ordered   By: Chris Mcmahan  Performed:   Due: 51MAF6300    Discussion/Summary    This is a 48 yo female with right foot pain with a history of several foot surgeries  She carries the diagnosis of CRPS  Has intermittent swelling and persistent temperature change  The pain involves the top of her toes and the ball of her foot  She presents as a self referral to discuss DRG stimulation  She is an excellent candidate for DRG stimulation  DRG, DRG trial, risks/benefits and expectations re-reviewed with the patient and   She was unable to have her DRG trial with Dr Denisha Alberto secondary to insurance denial  She has [facility]  We will therefore proceed with a traditional SCS trial with additional placement of SCS electrodes along L5 and S1 on the right side  This will be done as a tunneled extension trial  We can determine her response and then proceed to immediate placement of the IPG one week from leads or removal if unsuccessful  MRI lumbar spine without stenosis  D/W patient and  in detail  The patient, patient's family was counseled regarding diagnostic results, instructions for management, prognosis, patient and family education, risks and benefits of treatment options  total time of encounter was 45 minutes  The risks of surgery were reviewed with the patient and family and they wish to proceed  These risks include, but are not limited to bleeding, infection, paralysis, numbness, nerve injury, CSF leak, bowel/bladder incontinence, device malfunction, and failure of treatment   All questions were answered and appropriate contact information was given in case questions arise in the future  They will undergo preoperative clearance and be scheduled for surgery in the near future  Chief Complaint  patient presents to office to discuss SCS  History of Present Illness  She presents in follow up to discuss options  She was unable to have her DRG trial with Dr Brent Stapleton secondary to insurance denial  She has [employer] [place]  In review, this is a very pleasant 49 yo female with chronic pain involving her right foot  She has a history of a neuroma resection in 2002 which went well  She developed return of neuromas on her right foot with subsequent resection in 2010 and sustained chronic pain  She underwent a peripheral nerve surgery in 2012 with some relief  She has remained in progressively worsening pain that has effected her walking  She describes a constant numbness and tingling with a pressure type pain on the top of her toes and ball of her foot  The pain is worse along the bottom of her foot  She has followed with pain management in [place] who feels she has CRPS  She has intermittent swelling of her toes  It is always colder in temperature  She finds it difficult to walk longer distances secondary to the pain  She presents as a self referral to discuss DRG stimulation  Review of Systems    Constitutional: No fever, no chills, feels well, no tiredness, no recent weight gain or weight loss  ENT: no complaints of earache, no loss of hearing, no nose bleeds, no nasal discharge, no sore throat, no hoarseness  Cardiovascular: No complaints of slow heart rate, no fast heart rate, no chest pain, no palpitations, no leg claudication, no lower extremity edema  Respiratory: No complaints of shortness of breath, no wheezing, no cough, no SOB on exertion, no orthopnea, no PND  Gastrointestinal: No complaints of abdominal pain, no constipation, no nausea or vomiting, no diarrhea, no bloody stools  Genitourinary: No complaints of dysuria, no incontinence, no pelvic pain, no dysmenorrhea, no vaginal discharge or bleeding  Musculoskeletal: joint swelling, limb pain and bilateral foot pain worse on right  Integumentary: No complaints of skin rash or lesions, no itching, no skin wounds, no breast pain or lump  Neurological: numbness, tingling and difficulty walking, but no headache, no confusion, no dizziness, no limb weakness, no convulsions and no fainting  Psychiatric: Not suicidal, no sleep disturbance, no anxiety or depression, no change in personality, no emotional problems  Endocrine: No complaints of proptosis, no hot flashes, no muscle weakness, no deepening of the voice, no feelings of weakness  Hematologic/Lymphatic: krill oil daily, but No complaints of swollen glands, no swollen glands in the neck, does not bleed easily, does not bruise easily  ROS reviewed  Active Problems   1  Allergic rhinitis (477 9) (J30 9)  2  Arthritis of right shoulder region (716 91) (M19 011)  3  Chronic diarrhea (787 91) (K52 9)  4  Chronic pain in right foot (729 5,338 29) (M79 671,G89 29)  5  Chronic pain syndrome (338 4) (G89 4)  6  Complex regional pain syndrome type 1 of right lower extremity (337 22) (G90 521)  7  Osteopenia (733 90) (M85 80)  8  Pre-procedural laboratory examinations (V72 63) (Z01 812)  9  Raynaud disease (443 0) (I73 00)  10  Sensory peripheral neuropathy (356 9) (G62 9)    Past Medical History   1  History of dysfunctional uterine bleeding (V13 29) (Z87 42)  2  History of Neuroma of foot (215 3) (D36 13)    The active problems and past medical history were reviewed and updated today  Surgical History   1  History of Complete Colonoscopy  2  History of Corneal LASIK  3  History of Excision Of Neuroma  4  History of Foot Surgery  5  History of Uterine Fibroid Embolization    The surgical history was reviewed and updated today  Family History  Mother   1   Family history of deafness or hearing loss (V19 2) (Z82 2)  2  Family history of Hip arthritis  Father   3  Family history of dementia (V17 2) (Z81 8)  4  Family history of Pacemaker Placement  Family History   5  Family history of thyroid disease (V18 19) (Z83 49)    The family history was reviewed and updated today  Social History    · Alcohol use (V49 89) (Z78 9)   · Exercise frequency (times/week)   ·    · Never a smoker   · Sedentary lifestyle (V15 89) (Z91 89)  The social history was reviewed and updated today  Current Meds  1  DHEA 50 MG Oral Capsule; TAKE AS DIRECTED; Therapy: 80SOS3314 to (Last Rx:22Jun2016) Ordered  2  Fluticasone Propionate 50 MCG/ACT Nasal Suspension; USE 2 SPRAYS IN EACH   NOSTRIL ONCE DAILY; Therapy: 06HVB4327 to (Last Rx:22Jun2016) Ordered  3  Lyrica 200 MG Oral Capsule; Take 1 capsule twice daily; Therapy: (Christine Power to Recorded  4  Methocarbamol 750 MG Oral Tablet; TAKE 1 TABLET 3 times daily PRN; Therapy: 27VLH2625 to (Last Rx:22Jun2016) Ordered  5  Norethindrone-Eth Estradiol 0 5-2 5 MG-MCG Oral Tablet; TAKE 1 TABLET DAILY AS   DIRECTED; Therapy: 64AHL3704 to (Evaluate:17Jun2017); Last Rx:22Jun2016 Ordered  6  OxyCONTIN 10 MG Oral Tablet ER 12 Hour Abuse-Deterrent; TAKE 1 TABLET EVERY 12   HOURS DAILY; Therapy: 75XBA6487 to (Evaluate:84Zhx1641); Last Rx:22Jun2016 Ordered  7  Vitamin D3 2000 UNIT Oral Capsule; TAKE 2 CAPSULE Daily; Therapy: 49WAG6721 to (Last Rx:22Jun2016) Ordered    The medication list was reviewed and updated today  The medication list was reviewed and updated today  Allergies   1   No Known Drug Allergies    Vitals  Vital Signs    Recorded: 08YWO3674 09:27AM   Temperature 97 8 F, Tympanic   Heart Rate 72, L Brachial Artery   Pulse Quality Normal, L Brachial Artery   Respiration Quality Normal   Respiration 16   Systolic 195, LUE, Sitting   Diastolic 802, LUE, Sitting   Height 5 ft 2 in   Weight 133 lb    BMI Calculated 24 33   BSA Calculated 1 61     Physical Exam     Constitutional Patient appears healthy and well developed  Head and Face Normal on inspection  Eyes Fundoscopic exam is benign  Neck No JVD  Carotid pulses: 2+ and equal bilaterally, without bruits  Respiratory Auscultation of lungs: Clear to auscultation  No rales, rhonchi, wheezes appreciated over the lungs bilaterally  Cardiovascular Auscultation of heart: Rhythm is regular, no gallop or rubs  No heart murmur appreciated  Peripheral vascular exam: Pedal Pulses: 2+ and equal bilaterally  Radial pulses: 2+ and equal bilaterally  Extremities: Peripheral circulation is normal    Abdomen Soft, nontender, and nondistended without guarding, rigidity or rebound tenderness  Musculo: Spine Contour is normal  No tenderness of the spine billaterally  Skin warm and dry  Neurologic - Mental Status: Alert and Oriented x3  Mood and affect: Affect is normal   Memory is grossly intact  Attention is WNL  [place] Simple Speech: Language is fluent  Cranial Nerve Exam:  2nd cranial nerve: Intact  3rd, 4th, and 6th cranial nerves: Intact  5th cranial nerve: Intact  7th cranial nerve: Intact  8th cranial nerve: Intact  9th and 10th cranial nerves: Intact  11th cranial nerve: Intact  12th cranial nerve: Intact  Motor System General Motor Strength: No pronator drift and no parietal drift  Motor System - Upper Extremities: Muscle strength: 5/5 bilaterally  Muscle tone: Normal bilaterally  Motor System - Lower Extremities: Muscle strength: 5/5 bilaterally  Muscle tone: Normal bilaterally  Reflexes: DTR's are brisk, symmetric and 2+ bilaterally  Babinski's reflex is down going bilaterally symmetrical bilaterally  Coordination: Normal    Sensory: Sensation grossly intact to light touch  Sensation grossly intact to pinprick  Gait and Station: [place] with a normal gait         Results/Data  Diagnostic Studies Reviewed Neurosurger [facility]:   I personally reviewed the MRI lumbar spine in detail with the patient  MRI Review Reviewed images and report of MRI lumbar spine 11/2016  Mild DDD  No stenosis  Transitional vertebral body  Future Appointments    Date/Time Provider Specialty Site   06/20/2017 10:30 AM DOTTY Acosta  Neurosurgery  [address] OR   06/28/2017 08:00 AM DOTTY Acosta  Neurosurgery [address] [place] [address] OR   08/10/2017 11:00 AM DOTTY Acosta  Neurosurgery [facility]   07/12/2017 10:30 AM Chris Sanchez Breaker [address] [facility]     Surgery Scheduling Form    Location:[address] [place] [address]    Confirmation Number:   Procedure Date: 6/20/171    Requested Time:   Surgeon: Josette Josue  Co-Surgeon:   [facility] Required:   Bed:1  Out Patient - No Bed Required1   Anesthesia: General      PROCEDURE DETAILS   Procedure: Placement of a percutaneous Thoracic SCS electrode with lumbar L5 and S1 SCS electrodes for trial with extensions  Laterality/Level: Anticipated frozen section:   CPT Code(s):1  53474 (X3)1    Pre-Op Diagnosis: Chronic pain, CRPS  Dx Code(s):1  G89 4, [id number]   Length of Procedure: 2 hours  Equipment: C-Arm  Equipment Needs: Prone, Motors (tceMEP), EMG, SSEP, Impulse Monitoring Confirmation # : [id number]   Implants/Representative: St  [place]   Is the patient able to walk up a flight of stairs, walk up a hill or do heavy housework WITHOUT having chest pain or shortness of breath? REGISTRATION & FINANCIAL CLEARANCE    FA Initials:   Insurance:1  [id number]    Policy Number:1  [id number]  Group Number:     PRE-ADMISSION TESTING/CLINICAL INFORMATION   PAT Location:1  Outpatient Testing Elsewhere1    PAT Comments:1  [facility] - 5/30/171   Type & Screen needed  1     Communication Barrier:   Primary Care Physician:1  [id number]       CONSULTS NEEDED:   Anesthesia Consult:   Medical Consult:   Cardiac Consult:     ALLERGIES AND ALERTS   Latex Allergy:1 NO1    Penicillin Allergy:1  NO1    Malignant Hyperthermia:1  NO1    Diabetic Patient:1  NO1    Height:1  5'2"1   Weight:1  96 96XP8   COMMENTS1   PLEASE USE CELL AS PRIMARY NUMBER - [phone]   Scheduling Information Provided by:Zenaida DOTY   IN OFFICE USE   Urgency:   Craniotomy Details:   Lab Studies Ordered: Full Labs Ordered, Medically Cleared   KAMINSKI - AFTER LABS BUT NO LATER THAN 6/13/171   Films   Bracing:   Bone Stimulator   Return to office 2 Weeks post op   Surgery one week later  Location:   Confirmation Number:   Procedure Date:   Requested Time:   1  1   Co-Surgeon:   [facility] Required:   Bed:   1  1   PROCEDURE DETAILS   1  1   Laterality/Level: Anticipated frozen section:   1  1   Dx Code(s):   1  1   Equipment:   1  1  1   Implants/Representative: 1    Is the patient able to walk up a flight of stairs, walk up a hill or do heavy housework WITHOUT having chest pain or shortness of breath? REGISTRATION & FINANCIAL CLEARANCE    FA Initials:   Insurance:   Policy Number: Group Number:     PRE-ADMISSION TESTING/CLINICAL INFORMATION   PAT Location:     Communication Barrier:   Primary Care Physician:     CONSULTS NEEDED:   Anesthesia Consult:   Medical Consult:   Cardiac Consult:     ALLERGIES AND ALERTS   Latex Allergy:   Penicillin Allergy:   Malignant Hyperthermia:   Diabetic Patient:   Height:   Weight:     COMMENTS   Scheduling Information Provided by:     IN OFFICE USE   Urgency:   Craniotomy Details:   1  1  1      Films   Bracing:   Bone Stimulator   1         1 Amended By: Valerie Gross; May 23 2017 11:08 AM EST    Signatures   Electronically signed by : DOTTY Blanco ; May 22 2017  1:06PM EST                       (Author)    Electronically signed by : DOTTY Blanco ; May 23 2017 11:19AM EST                       (Author)

## 2018-03-07 NOTE — H&P
HPI Data Review  Alto Severs is here for an [facility] Physical      Her health history form was reviewed:    Salina Carmona reports the following issues: She has undergone 3 right foot surgeries for original diagnosis of Portillo's neuroma, and has developed a chronic pain syndrome and cannot fully bear weight on her right foot (she is using crutches); she is concerned about increasing inactivity, management of her chronic pain, and the effect of her pain medications in contributing to her inactivity (Salina Carmona reports feeling sedated due to her pain medications)  Salina Carmona also reports some increased pain in her left foot because of increased weightbearing, in the right fourth MTP region  She also did have Portillo's neuroma surgery of the left foot, without residual pain  She is currently followed by a chiropractor and a surgical specialist in [place]  She recently had testing to rule out vascular compromise of the foot and apparently this testing was negative  She reports negative bone scan as well  She also reports having lifelong diarrhea and a history of a previous unremarkable colonoscopy  Salina Carmona generally will have a loose bowel movement in the morning right after eating breakfast, and has to go to the bathroom right away  This has been a pattern throughout her life  She has no formal diagnosis, and no known family history of IBS, inflammatory bowel disease or celiac disease  If she doesn't eat breakfast, she can go about her day without having diarrhea  Salina Carmona reports stable vision with glasses and is up-to-date on eye care  She sees a dermatologist once year with no personal or family history of skin cancer  She is up-to-date on dental care  She is up-to-date on GYN care  Review of Systems  Nicolle reports no current upper respiratory allergic symptoms, she reports stable vision, mild decreased hearing   Despite history of right shoulder arthritis, she reports no shoulder pain or stiffness or other orthopedic symptoms other than those noted regarding her foot problems  Katarina Bautista has lifelong mild or nodes symptoms  There is no associated malar rash or other rash, no soft Nichole symptoms  Also, her review of systems on her health history form was reviewed with her  Please refer to this for further details  Active Problems    1  Allergic rhinitis (477 9) (J30 9)   2  Arthritis of right shoulder region (716 91) (M19 90)   3  Chronic diarrhea (787 91) (K52 9)   4  Chronic pain in right foot (729 5,338 29) (M79 671,G89 29)   5  Osteopenia (733 90) (M85 80)   6  Sensory peripheral neuropathy (356 9) (G62 9)    Past Medical History    1  History of dysfunctional uterine bleeding (V13 29) (Z87 42)   2  History of Neuroma of foot (215 3) (D36 13)  Nicolle appears to be up-to-date on GYN follow-up  There is a history of previous dysfunctional uterine bleeding due to fibroids  She has a history of seasonal upper respiratory allergies controlled with Fluticasone nasal spray  Surgical History    1  History of Complete Colonoscopy   2  History of Corneal LASIK   3  History of Excision Of Neuroma   4  History of Uterine Fibroid Embolization  Nicolle underwent removal of Portillo's neuroma of the right and left foot in 2002  She developed recurrent pain in the right foot and had a second neuroma surgery in 2010  After having a foot cramp resulting in recurrent pain in the right plantar foot region in 2012, she underwent another surgery that she describes as a nerve reattachment procedure involving the plantar aspect of the right foot  Since 2012, her symptoms of paresthesias of the distal central plantar foot and the toes, has persisted  Her pain occurs where she moves her toes, but she has no pain at rest  The pain also increases with weightbearing  She has had uterine fibroid embolization  Family History  Mother    1  Family history of deafness or hearing loss (V19 2) (Z82 2)   2   Family history of Hip arthritis  Father 3  Family history of dementia (V17 2) (Z81 8)   4  Family history of Pacemaker Placement  Alvin Hayes reports that her father is age 80, and has a history of pacemaker placement, hyperlipidemia and dementia  Alvin Hayes reports that her mother is age 80, and has a history of arthritis with bilateral hip replacements, and has tiny percent hearing loss  She reports that she has 3 siblings who apparently are in good health  Social History    · Alcohol use (V49 89) (Z78 9)   · Exercise frequency (times/week)   ·    · Never a smoker   · Sedentary lifestyle (V15 89) (Z91 89)  Alvin Hayes is using crutches because of her chronic right foot pain  This is causing declining physical activity  Alvin Hayes does report that she stretches, lifts weights and does core body exercises 2-3 times per week for half an hour  Alvin Hayes reports having a balanced diet  Alvin Hayes has never smoked, and reports drinking 2 alcoholic drinks per day  Alvin Hayes is  and has 3 daughters in her 25s who are in good health  Allergies    1  No Known Drug Allergies    Immunizations  At her current age, Alvin Hayes should receive flu vaccine yearly and should have Adacel if not already administered in the last 5 years  Vitals  Signs [Data Includes: Current Encounter]   Recorded: [id number] 11:16AM   Temperature: 97 8 F, Tympanic  Respiration: 12  Recorded: [id number] 10:55AM   Heart Rate: 80, R Radial  Pulse Quality: Regular, R Radial  Systolic: 470, RUE, Sitting  Diastolic: 74, RUE, Sitting  Height: 5 ft 2 in  Weight: 128 lb   BMI Calculated: 23 41  BSA Calculated: 1 58    Physical Exam  Nicolle appears well, in no distress  She has crutches with her but can walk a short distance without them with no gait instability  Vital signs stable, visual acuity with glasses is 20/25 in the left eye and 20/20 in the right  Grossly there is no hearing deficit      HEENT: Normal cephalic atraumatic, pupils 3 mm, with normal direct and consensual light response, extraocular eye movements intact  No icterus or pallor of the sclera or conjunctiva respectively  External auditory canals and panic membranes normal  Nasal passages patent with normal mucosa, oral cavity and throat normal  There is no adenopathy or mass of the head  Dentition is normal  There is no gum disease  Neck: Supple and atraumatic, nontender without JVD, no carotid bruits and carotid pulses normal  On inspection and palpation the thyroid is normal     Skin: No rash or skin malignancy  Thorax: Atraumatic, no kyphosis  Lungs: Clear to auscultation, and normal to percussion posteriorly  There is no supraclavicular adenopathy  Cardiac: Regular rate and rhythm, normal S1 and S2, no murmur, no S4 or S3  Back: No CVA masses or CVA tenderness, no scoliosis or kyphosis noted  Abdomen: Nondistended, no surgical scars, no hernia or periumbilical adenopathy, normal bowel sounds, soft and nontender without masses bruits or organomegaly present  There is no tympany or tenderness over the bladder  There is no hypogastric mass  Extremities: There is no blanching of the toes or fingers, but there is minor decrease temperature of these digits  There are no abnormalities of the fingernails or toenails  There is no sclerodactyly, no telangiectasias  Joints without inflammation or deformity other than exam of the feet  No loss of range of motion of joints other than the feet  Exam of the left foot demonstrates a small well-healed scar of the distal dorsal foot consistent with previous Portillo's neuroma surgery  Exam of the right foot demonstrates multiple scars, one of the midline distal dorsal foot at junction with digits, one immediately lateral, with a third scar involving the plantar aspect, sagittal in the midline from the midfoot distal to the MTP region  There is some limited range of motion of the MTP joints  Sensation intact      Circulation: 2 over 2 radial brachial popliteal posterior tibial and dorsalis pedis pulses are present  There are no phlebitic findings or calf tenderness  There is no edema  Mood is optimal, there is no cognitive deficit  There is no tremor or rigidity  Muscle mass is normal       Results/Data  A 12 lead ECG was performed and was normal    Rhythm and rate: normal sinus rhythm  Procedure    Procedure: Hearing Acuity Test    Indication: Routine screeing  Audiometry: Normal bilaterally  Hearing in the right ear: 15 decibals at 500 hertz, 10 decibals at 1000 hertz, 10 decibals at 2000 hertz, 5 decibals at 4000 hertz, 0 decibals at 6000 hertz and 5 decibals at 8000 hertz  Hearing in the left ear: 15 decibals at 500 hertz, 15 decibals at 1000 hertz, 15 decibals at 2000 hertz, 10 decibals at 4000 hertz, 20 decibals at 6000 hertz and 0 decibals at 8000 hertz  Procedure: Audiological Assessment  Assessment:   Otoscopy:   Right Otoscopy: normal    Left Otoscopy: normal    Tympanometry:   Right Tympanometry: normal    Left Tympanometry: normal    Otoacoustic Emissions:   Right Otoacoustic Emissions: DNT  Left Otoacoustic Emissions: DNT  Audiometric Test:   Tone Audiometry:   Right Tone Audiometry: normal    Left Tone Audiometry: normal    Right Speech Audiometry: SRT: 0 dBHL  Discrimination: 100% at 35 dBHL  Left Speech Audiometry: SRT: 0 dBHL  Discrimination: 100% at 35 dBHL  Reliability: good  Impression: hearing was normal bilaterally  Right Ear: hearing was normal    Left Ear: hearing was normal    Recommendation: Biannual hearing evaluation or PRN  Cardiology Consultation  1  EKG normal sinus rhythm  Normal  2  Echocardiogram normal left ventricular size and function EF60%  Normal valves  3  No stress test preformed  No symptoms of obstructive CAD  4  Total cholesterol, 222, , HDL 54, TG 95, /74, HGB A1C 5 0, 10 year risk of MI or stroke is 1 8%  CRPhs 1 29, calcium score 0  5   Aorta normal 1 86 cm, IRMA 0 99 right 1 0 left normal  Mild plaque L carotid artery with no obstruction  Audiology Assessment  Otoscopy revealed mild amounts of cerumen with visible tympanic membranes, bilaterally  Tympanometry showed normal ear canal volume, middle ear pressure and compliance, bilaterally  Pure tone audiometry displayed normal hearing sensitivity at all frequencies tested (250-8k Hz), bilaterally  Speech Reception Thresholds (SRT) were consistent with pure tone results and Word Recognition Scores (WRS) were excellent, bilaterally  Recommend annual or biannual hearing examination  Fitness Assessment  Nicolle scored at the 60% on her body composition assessment with a bodyfat % of 28 5%  [address] scored in the excellent range for flexibility with a sit & reach score of 36 cm  Her Muscle Strength/Endurance scores placed her 95% (upper body) with an arm curl score of 27  Overall Daysi Rodriguez would be considered to have an excellent fitness level with an 80% score  Continued emphasis on regular exercise and sound nutrition practices will enable Nicolle to maintain her optimal level of fitness  Nutrition Assessment  Daysi Rodriguez presents today for Nutrition Assessment and Counseling  She reports a decreased appetite due to physical inactivity secondary to her foot issues  Food recall reveals a predicted suboptimal protein intake  She admits to not reading food labels for protein and RD encouraged Nicolle to also look at fiber grams in foods for optimal GI health  Daysi Rodriguez also has interest in maintaining her current weight and her current body composition  Review of lipid panel was discussed during session   Ht: 62", Wt: 122 4#, #, Tanita BMR 1231kcal, Fat Mass 34 8#  Medications : reviewed from questionnaire   Labs: Vit D 41 1, Chol 222, , Trig 95,HDL 54  Nutrition Diagnoses: Predicted suboptimal protein and fiber intake related to food and nutrition knowledge deficit as evidenced by patient interview, diet history and lab values    Nutrition Intervention: Education  Goals:  1  Susan Adams will recall reading food labels for protein and fiber by next encounter  2  Susan Adams will maintain her weight and body composition within the next 12 months using [email or web address] for food journaling     3  Susan Adams will have improved lipid values with report of consuming higher fiber foods within 6-12 months  Provided A  N D Heart Healthy Nutrition Therapy  Expected Compliance: Very Good   Comprehension: Very Good      Assessment    1  Encounter for preventive health examination (V70 0) (Z00 00)    Plan    1  Fluticasone Propionate 50 MCG/ACT Nasal Suspension; USE 2 SPRAYS IN EACH   NOSTRIL ONCE DAILY    2  Methocarbamol 750 MG Oral Tablet; TAKE 1 TABLET 3 times daily PRN    3  OxyCONTIN 10 MG Oral Tablet ER 12 Hour Abuse-Deterrent; TAKE 1 TABLET   EVERY 12 HOURS DAILY    4  DHEA 50 MG Oral Capsule; TAKE AS DIRECTED   5  Norethindrone-Eth Estradiol 0 5-2 5 MG-MCG Oral Tablet; TAKE 1 TABLET DAILY AS   DIRECTED   6  Vitamin D3 2000 UNIT Oral Capsule; TAKE 2 CAPSULE Daily    7  Nortriptyline HCl - 25 MG Oral Capsule; TAKE 2 CAPSULE Daily    Discussion/Summary    Executive Physical Summary:   Chronic pain in the right foot and long term treatment with Oxycontin: consider re evaluation of pain medication with strategy to avoid narcotic analgesics if possible  Sedation due to Medications with significant effect on quality of life (as per patient): consider less sedating alternatives to Nortriptyline for neuropathic pain and non-opiate options for pain management  Lifelong once daily diarrhea: recommend GI work up including evaluation for irritable bowel syndrome, with celiac disease and inflammatory bowel disease less likely but in the differential   Leukopenia: follow up CBC/diff  hyperlipidemia: follow up on lifestyle and lipid panel  Heart and Vascular Summary:   1  EKG normal sinus rhythm  Normal  2  Echocardiogram normal left ventricular size and function EF60%  Normal valves    3  No stress test preformed  No symptoms of obstructive CAD  4  Total cholesterol, 222, , HDL 54, TG 95, /74, HGB A1C 5 0, 10 year risk of MI or stroke is 1 8%  CRPhs 1 29, calcium score 0  5  Aorta normal 1 86 cm, IRMA 0 99 right 1 0 left normal  Mild plaque L carotid artery with no obstruction  Nutritional Summary and Recommendations:   Neo Echevarria presents today for Nutrition Assessment and Counseling  She reports a decreased appetite due to physical inactivity secondary to her foot issues  Food recall reveals a predicted suboptimal protein intake  She admits to not reading food labels for protein and RD encouraged Nicolle to also look at fiber grams in foods for optimal GI health  Neo Echevarria also has interest in maintaining her current weight and her current body composition  Review of lipid panel was discussed during session  Ht: 62", Wt: 122 4#, #, Tanita BMR 1231kcal, Fat Mass 34 8#  Medications : reviewed from questionnaire   Labs: Vit D 41 1, Chol 222, , Trig 95,HDL 54  Nutrition Diagnoses: Predicted suboptimal protein and fiber intake related to food and nutrition knowledge deficit as evidenced by patient interview, diet history and lab values  Nutrition Intervention: Education  Goals:  1  Neo Echevarria will recall reading food labels for protein and fiber by next encounter  2  Neo Echevarria will maintain her weight and body composition within the next 12 months  3  Neo Echevarria will have improved lipid values with report of consuming higher fiber foods within 6-12 months  Provided A  N D Heart Healthy Nutrition Therapy  Expected Compliance: Very Good Comprehension: Very Good  Fitness Summary and Recommendations:  Neo Echevarria scored at the 60% on her body composition assessment with a bodyfat % of 28 5%  Neo Echevarria scored in the excellent range for flexibility with a sit & reach score of 36 cm  Her Muscle Strength/Endurance scores placed her 95% (upper body) with an arm curl score of 27   Overall Nicolle would be considered to have an excellent fitness level with an 80% score  Continued emphasis on regular exercise and sound nutrition practices will enable Nicolle to maintain her optimal level of fitness  Hearing Assessment Summary:   Otoscopy revealed mild amounts of cerumen with visible tympanic membranes, bilaterally  Tympanometry showed normal ear canal volume, middle ear pressure and compliance, bilaterally  Pure tone audiometry displayed normal hearing sensitivity at all frequencies tested (250-8k Hz), bilaterally  Speech Reception Thresholds (SRT) were consistent with pure tone results and Word Recognition Scores (WRS) were excellent, bilaterally  Recommend annual or biannual hearing examination                            Signatures   Electronically signed by : DOTTY Go ; Jun 23 2016  4:26PM EST                       (Author)

## 2018-03-07 NOTE — H&P
Executive Physical Summary:   Chronic pain in the right foot and long term treatment with Oxycontin: consider re evaluation of pain medication with strategy to avoid narcotic analgesics if possible  Sedation due to Medications with significant effect on quality of life (as per patient): consider less sedating alternatives to Nortriptyline for neuropathic pain and non-opiate options for pain management  Lifelong once daily diarrhea: recommend GI work up including evaluation for irritable bowel syndrome, with celiac disease and inflammatory bowel disease less likely but in the differential   Leukopenia: follow up CBC/diff  hyperlipidemia: follow up on lifestyle and lipid panel  Heart and Vascular Summary:   1  EKG normal sinus rhythm  Normal  2  Echocardiogram normal left ventricular size and function EF60%  Normal valves  3  No stress test preformed  No symptoms of obstructive CAD  4  Total cholesterol, 222, , HDL 54, TG 95, /74, HGB A1C 5 0, 10 year risk of MI or stroke is 1 8%  CRPhs 1 29, calcium score 0  5  Aorta normal 1 86 cm, IRMA 0 99 right 1 0 left normal  Mild plaque L carotid artery with no obstruction  Nutritional Summary and Recommendations:   Hosea Fiore presents today for Nutrition Assessment and Counseling  She reports a decreased appetite due to physical inactivity secondary to her foot issues  Food recall reveals a predicted suboptimal protein intake  She admits to not reading food labels for protein and RD encouraged Nicolle to also look at fiber grams in foods for optimal GI health  Hosea Fiore also has interest in maintaining her current weight and her current body composition  Review of lipid panel was discussed during session        Ht: 62", Wt: 122 4#, #, Tanita BMR 1231kcal, Fat Mass 34 8#  Medications : reviewed from questionnaire   Labs: Vit D 41 1, Chol 222, , Trig 95,HDL 54  Nutrition Diagnoses: Predicted suboptimal protein and fiber intake related to food and nutrition knowledge deficit as evidenced by patient interview, diet history and lab values  Nutrition Intervention: Education  Goals:  1  Wicho Michaels will recall reading food labels for protein and fiber by next encounter  2  Wicho Michaels will maintain her weight and body composition within the next 12 months  3  Wicho Michaels will have improved lipid values with report of consuming higher fiber foods within 6-12 months  Provided A  N D Heart Healthy Nutrition Therapy  Expected Compliance: Very Good Comprehension: Very Good  Fitness Summary and Recommendations:  Wicho Michaels scored at the 60% on her body composition assessment with a bodyfat % of 28 5%  Wicho Michaels scored in the excellent range for flexibility with a sit & reach score of 36 cm  Her Muscle Strength/Endurance scores placed her 95% (upper body) with an arm curl score of 27  Overall Wicho Michaels would be considered to have an excellent fitness level with an 80% score  Continued emphasis on regular exercise and sound nutrition practices will enable Nicolle to maintain her optimal level of fitness  Hearing Assessment Summary:   Otoscopy revealed mild amounts of cerumen with visible tympanic membranes, bilaterally  Tympanometry showed normal ear canal volume, middle ear pressure and compliance, bilaterally  Pure tone audiometry displayed normal hearing sensitivity at all frequencies tested (250-8k Hz), bilaterally  Speech Reception Thresholds (SRT) were consistent with pure tone results and Word Recognition Scores (WRS) were excellent, bilaterally  Recommend annual or biannual hearing examination                             Electronically signed by : DOTTY Toribio ; Jun 23 2016  4:26PM EST                       (Author)

## 2021-03-10 DIAGNOSIS — Z23 ENCOUNTER FOR IMMUNIZATION: ICD-10-CM

## (undated) DEVICE — DRAPE C-ARM X-RAY

## (undated) DEVICE — TIBURON LAPAROTOMY DRAPE: Brand: CONVERTORS

## (undated) DEVICE — SKIN MARKER DUAL TIP WITH RULER CAP, FLEXIBLE RULER AND LABELS: Brand: DEVON

## (undated) DEVICE — 3M™ TEGADERM™ TRANSPARENT FILM DRESSING FRAME STYLE, 1626W, 4 IN X 4-3/4 IN (10 CM X 12 CM), 50/CT 4CT/CASE: Brand: 3M™ TEGADERM™

## (undated) DEVICE — GLOVE SRG BIOGEL 8

## (undated) DEVICE — 3M™ IOBAN™ 2 ANTIMICROBIAL INCISE DRAPE 6650EZ: Brand: IOBAN™ 2

## (undated) DEVICE — PROXIMATE PLUS MD MULTI-DIRECTIONAL RELEASE SKIN STAPLERS CONTAINS 35 STAINLESS STEEL STAPLES APPROXIMATE CLOSED DIMENSIONS: 6.9MM X 3.9MM WIDE: Brand: PROXIMATE

## (undated) DEVICE — VIAL DECANTER

## (undated) DEVICE — BETADINE SCRUB BRUSH

## (undated) DEVICE — GLOVE SRG BIOGEL 7

## (undated) DEVICE — SUT SILK 2-0 SH 30 IN K833H

## (undated) DEVICE — TIBURON SPLIT SHEET: Brand: CONVERTORS

## (undated) DEVICE — INTENDED FOR TISSUE SEPARATION, AND OTHER PROCEDURES THAT REQUIRE A SHARP SURGICAL BLADE TO PUNCTURE OR CUT.: Brand: BARD-PARKER SAFETY BLADES SIZE 10, STERILE

## (undated) DEVICE — PREP SURGICAL PURPREP 26ML

## (undated) DEVICE — DRAPE C-ARMOUR

## (undated) DEVICE — STRL UNIVERSAL MINOR GENERAL: Brand: CARDINAL HEALTH

## (undated) DEVICE — TELFA NON-ADHERENT ABSORBENT DRESSING: Brand: TELFA

## (undated) DEVICE — GLOVE INDICATOR PI UNDERGLOVE SZ 8 BLUE

## (undated) DEVICE — GLOVE INDICATOR PI UNDERGLOVE SZ 7 BLUE

## (undated) DEVICE — ANTIBACTERIAL VIOLET BRAIDED (POLYGLACTIN 910), SYNTHETIC ABSORBABLE SUTURE: Brand: COATED VICRYL

## (undated) DEVICE — NEUROSTIM CABLE CONNECTOR AXIUM

## (undated) DEVICE — 3M™ TEGADERM™ TRANSPARENT FILM DRESSING FRAME STYLE, 1624W, 2-3/8 IN X 2-3/4 IN (6 CM X 7 CM), 100/CT 4CT/CASE: Brand: 3M™ TEGADERM™

## (undated) DEVICE — SPONGE PVP SCRUB WING STERILE

## (undated) DEVICE — SYRINGE EPI 8ML LUER SLIP LOSS OF RESISTANCE PLASTIC PERFIX

## (undated) DEVICE — TUBING SUCTION 5MM X 12 FT

## (undated) DEVICE — REM POLYHESIVE ADULT PATIENT RETURN ELECTRODE: Brand: VALLEYLAB

## (undated) DEVICE — BULB SYRINGE,IRRIGATION WITH PROTECTIVE CAP: Brand: DOVER

## (undated) DEVICE — UNIVERSAL SPINE,KIT: Brand: CARDINAL HEALTH

## (undated) DEVICE — 10FR FRAZIER SUCTION HANDLE: Brand: CARDINAL HEALTH

## (undated) DEVICE — TUNNELING TOOL AXIUM 30CM

## (undated) DEVICE — NEEDLE 25G X 1 1/2